# Patient Record
(demographics unavailable — no encounter records)

---

## 2024-10-22 NOTE — PROCEDURE
[FreeTextEntry1] : Procedure: supartz injections b/L knee   The procedure risks, benefits and alternatives was discussed with the patient. written consent was obtained prior to the procedure and is detailed in the patient's record.  Indications: therapeutic purposes  #1 site identified in the right knee.  Anesthesia was with ethyl chloride and 1 ml of xylocaine 1% The patient was prepped with betadine solution, alcohol and chlorhexidine.  A 21 gauge 1.5 inch needle was used. 23cc of fluid was aspirated. Injectables: was injected into the site supartz.  the patient tolerated the procedure well. there were no complications. handouts/patient instructions were given to patient . patient was instructed to call if redness at site, a decrease in range of motion or an increase in pain is noted after procedure.  # 2 site identified in the left knee I aspirated0 cc and injected the left knee with supartz x 1

## 2025-01-16 NOTE — ASSESSMENT
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  Osteporosis Prolia last obtained 08/2024 DEXA to be obtained, she plans on it.   OA -Krystal plan for after 4/29/25 when she is eligible today -Aspirated 5 cc of serous fluid today 1/16/25, requested no steroid be injected -Continue PT -voltaren gel PRN  She plans on having aortic stent placed. She is still deciding.    f/u 6 months RPA Sooner for MIN 2/2/205 prolia is already scheduled.

## 2025-01-16 NOTE — PHYSICAL EXAM
[TextEntry] :  Constitutional:. Well developed, well-nourished, elderly, white female in no acute distress. She is alert and oriented x3.walking with a cane. Eyes: the sclera and conjunctiva were normal, pupils were equal in size, round, and reactive to light and extraocular movements were intact. ENT: the ears and nose were normal in appearance . the oropharynx was normal. No oral or nasal cavity dryness, no aphthous ulcers in the mouth and nose, no cervical adenopathy, and no nasal septal perforations. Neck: the appearance of the neck was normal . there was no jugular-venous distention. Pulmonary:. Lungs are clear to auscultation and percussion. There were no rales, rhonchi, inspiratory or expiratory wheezes. Heart:. The rhythm was regular.The first and second heart sounds were normal. There were no murmurs, rubs, clicks, or gallops. Vascular: the pedal pulses are present . there was no peripheral edema. Abdomen: soft, non-tender and no hepato-splenomegaly. Lymphatics: The posterior cervical, anterior cervical and supraclavicular nodes were non-tender and normal size. Back:. kyphosis of cervical, thoracic spine. Musculoskeletal:. Changes of osteoarthritis in her DIPs and PIPs as well as CMC joints consistent with advanced, mature, osteoarthritis. The knees have crepitations bilaterally and have bony deformities with decreased flexion especially in the right knee, which is less than 85. There were no flexion contractures. Right knee has a small effusion. Left knee had none. Neither knee had any synovitis.  Skin: normal skin color and pigmentation and normal skin turgor . No psoriasis, subcutaneous nodules, tophi or other abnormal skin findings. Neurological: the sensory exam was normal to light touch and pinprick, the motor exam was normal and no focal deficits. Psychiatric: oriented to person, place, and time, insight and judgment were intact and the affect was normal.

## 2025-01-16 NOTE — HISTORY OF PRESENT ILLNESS
[FreeTextEntry1] : f/u for OA, osteoporosis, crystalline arthritis, likely pseudogout.     Prolia injection was 08/9/24. Last DEXA was 12/10/2020  Last Knee gel shot was 10/29/24.  Finds them very helpful.   She is eligible again after 4/29/25.   She is doing PT When she is not weight bearing, she has no pain. When she starts to walk, she has mild pain 2-3/10. Thinks her R knee might be a little swollen. Doesn't want a CS shot, but is interested in having some fluid removed.    Cardiologist wants to put a stent in aorta.    6-12 months

## 2025-01-16 NOTE — PROCEDURE
[FreeTextEntry1] : Procedure: R knee arthrocentesis   The procedure risks was discussed with the patient.  Indications: therapeutic purposes  #1 site identified in the Right knee . Verbal consent was obtained.  Anesthesia was with ethyl chloride.  The patient was prepped with betadine solution.  A 21 gauge 1.5 inch needle was used.  Aspirate 5 cc clear yellow fluid  Injectables: None, patient declining steroid IA  The patient tolerated the procedure well..  There were no complications. Handouts/patient instructions were given to patient . patient was instructed to call if redness at site, a decrease in range of motion or an increase in pain is noted after procedure.

## 2025-01-24 NOTE — HISTORY OF PRESENT ILLNESS
[FreeTextEntry1] : Ms. Dunn is referred by Dr. Merchant for evaluation of aortic stenosis. She is a 93y/o female with HTN, HLD, DM and carotid stenosis. She also has CAD with stents placed in 2012 and 2014. Echo done on 11/18/2024 showed severe AS.     Echo from 11/18 was uploaded and images were reviewed by our team, including Dr. Nancy Coleman, Director of Structural Echo, with the following impressions:

## 2025-01-28 NOTE — PHYSICAL EXAM
[Normal Rate] : normal [Rhythm Regular] : regular [Normal S1] : normal S1 [Normal S2] : normal S2 [II] : a grade 2 [III] : a grade 3 [No Pitting Edema] : no pitting edema present [2+] : left 2+ [Normal] : alert and oriented, normal memory

## 2025-01-28 NOTE — PHYSICAL EXAM
[General Appearance - Alert] : alert [General Appearance - In No Acute Distress] : in no acute distress [Neck Appearance] : the appearance of the neck was normal [Neck Cervical Mass (___cm)] : no neck mass was observed [Jugular Venous Distention Increased] : there was no jugular-venous distention [Thyroid Diffuse Enlargement] : the thyroid was not enlarged [Thyroid Nodule] : there were no palpable thyroid nodules [Auscultation Breath Sounds / Voice Sounds] : lungs were clear to auscultation bilaterally [Normal Rate] : normal [Rhythm Regular] : regular [Normal S1] : normal S1 [Normal S2] : normal S2 [II] : a grade 2 [III] : a grade 3 [No Pitting Edema] : no pitting edema present [2+] : left 2+ [Bowel Sounds] : normal bowel sounds [Abdomen Soft] : soft [Abdomen Tenderness] : non-tender [] : no hepato-splenomegaly [Abdomen Mass (___ Cm)] : no abdominal mass palpated [Abnormal Walk] : normal gait [Nail Clubbing] : no clubbing  or cyanosis of the fingernails [Musculoskeletal - Swelling] : no joint swelling seen [Motor Tone] : muscle strength and tone were normal [Deep Tendon Reflexes (DTR)] : deep tendon reflexes were 2+ and symmetric [Sensation] : the sensory exam was normal to light touch and pinprick [No Focal Deficits] : no focal deficits [Oriented To Time, Place, And Person] : oriented to person, place, and time [Impaired Insight] : insight and judgment were intact [Affect] : the affect was normal

## 2025-01-28 NOTE — HISTORY OF PRESENT ILLNESS
[FreeTextEntry1] : Ms. Dunn is referred by Dr. Merchant for evaluation of aortic stenosis. She is a 93y/o female with HTN, HLD, DM and carotid stenosis. She also has CAD with stents placed in 2012 and 2014. Echo done on 11/18/2024 showed severe AS.  She is accompanied today by her grandson who provides additional information. Her symptoms have been followed closely for the past 18 months. Though she was previously very independent and active, she has slowed down in recent months, experiencing worsening fatigue. She denies dyspnea on exertion but has limited her activities due to feeling tired all the time. She denies chest discomfort and lightheadedness. She does get some leg swelling, but "not every day".   NYHA II   Echo from 11/18 was uploaded and images were reviewed by our team, including Dr. Nancy Coleman, Director of Structural Echo, with the following impressions: Trileaflet aortic valve with severe AS and mild AI. MAR= 0.76cm2, DVI= 0.21. Peak/mean gradients= 56/31mmHg, peak velocity= 3.8m/s. Of note there is at least moderate MR (might be more). BIventricular function is normal.

## 2025-01-28 NOTE — DISCUSSION/SUMMARY
[FreeTextEntry1] : ALEJANDRO: Symptomatic severe AS. Schedule CT ([place] [facility] facility), cardiac catheterization, and give her a tentative date for TAVR. We will proceed with scheduling him / her for a coronary angiogram and cardiac structural CT. Once completed, all imaging will be reviewed by the Heart Team and an optimal treatment strategy recommended. We will give a tentative date for TAVR. I will notify   of our impressions and plan. I discussed the potential risks and benefits of the procedure with the patient in detail including death, stroke, bleeding, infection, PVL, vascular complications, and the possible need for a permanent pacemaker. All questions were answered in detail.

## 2025-01-28 NOTE — HISTORY OF PRESENT ILLNESS
[FreeTextEntry1] : Ms. Dunn is referred by Dr. Merchant for evaluation of aortic stenosis. She is a 93y/o female with HTN, HLD, DM and carotid stenosis. She also has CAD with stents placed in 2012 and 2014. Echo done on 11/18/2024 showed severe AS.  She is accompanied today by her grandson who provides additional information. Her symptoms have been followed closely for the past 18 months. Though she was previously very independent and active, she has slowed down in recent months, experiencing worsening fatigue. She denies dyspnea on exertion but has limited her activities due to feeling tired all the time. She denies chest discomfort and lightheadedness. She does get some leg swelling, but "not every day".   NYHA II   Echo from 11/18 was uploaded and images were reviewed by our team, including Dr. Nancy Coleman, Director of Structural Echo, with the following impressions: Trileaflet aortic valve with severe AS and mild AI. MAR= 0.76cm2, DVI= 0.21. Peak/mean gradients= 56/31mmHg, peak velocity= 3.8m/s. Of note there is at least moderate MR (might be more). BIventricular function is normal.  She is accompanied today by her son, who lives 10 miles from her and sees her a few times a week. He notes she has markedly slowed down. She reports progressive fatigue over the past few months. She is usually very active and notes "I like to do things" and was an avid , but was unable to this past summer. He notes Dr Merchant has been following her AS closely for at least the past 2 years. She has cut back on using her steps in the house. She has to take a rest while cooking, which is new for her. She will sit down and rest even after cooking herself dinner. She has no angina or syncope. She notes some dizzness "if I feel tired" but no syncope. She has been on Plavix since her stents but stopped aspirin about a year ago. There have been no recent medication changes. She reports edema "soemtimes" that improves with leg elevation. She reports her BP is usually well controlled when she checks it at home; she states her SBP this morning was 125 mmHg and she was anxious coming here today.

## 2025-01-28 NOTE — ASSESSMENT
[FreeTextEntry1] : 92F with severe symptomatic aortic stenosis.  Risks and benefits of TAVR explained to patient and son who is agreeable to proceed.  Pt will need cardiac cath and cardiac CT prior to TAVR.

## 2025-01-28 NOTE — REVIEW OF SYSTEMS
[Feeling Tired] : feeling tired [Lower Ext Edema] : lower extremity edema [Negative] : Psychiatric [Fever] : no fever [Chills] : no chills [Chest Pain] : no chest pain [SOB on Exertion] : no shortness of breath during exertion

## 2025-01-28 NOTE — HISTORY OF PRESENT ILLNESS
[FreeTextEntry1] : Ms. Dunn is referred by Dr. Merchant for evaluation of aortic stenosis. She is a 91y/o female with HTN, HLD, DM and carotid stenosis. She also has CAD with stents placed in 2012 and 2014. Echo done on 11/18/2024 showed severe AS.  She is accompanied today by her grandson who provides additional information. Her symptoms have been followed closely for the past 18 months. Though she was previously very independent and active, she has slowed down in recent months, experiencing worsening fatigue. She denies dyspnea on exertion but has limited her activities due to feeling tired all the time. She denies chest discomfort and lightheadedness. She does get some leg swelling, but "not every day".   NYHA II   Echo from 11/18 was uploaded and images were reviewed by our team, including Dr. Nancy Coleman, Director of Structural Echo, with the following impressions: Trileaflet aortic valve with severe AS and mild AI. MAR= 0.76cm2, DVI= 0.21. Peak/mean gradients= 56/31mmHg, peak velocity= 3.8m/s. Of note there is at least moderate MR (might be more). BIventricular function is normal.